# Patient Record
Sex: FEMALE | Race: WHITE
[De-identification: names, ages, dates, MRNs, and addresses within clinical notes are randomized per-mention and may not be internally consistent; named-entity substitution may affect disease eponyms.]

---

## 2019-12-03 ENCOUNTER — HOSPITAL ENCOUNTER (EMERGENCY)
Dept: HOSPITAL 33 - ED | Age: 39
LOS: 1 days | Discharge: HOME | End: 2019-12-04
Payer: COMMERCIAL

## 2019-12-03 DIAGNOSIS — Z79.899: ICD-10-CM

## 2019-12-03 DIAGNOSIS — R10.9: ICD-10-CM

## 2019-12-03 DIAGNOSIS — R11.2: ICD-10-CM

## 2019-12-03 DIAGNOSIS — D64.9: Primary | ICD-10-CM

## 2019-12-03 DIAGNOSIS — N05.9: ICD-10-CM

## 2019-12-03 DIAGNOSIS — I10: ICD-10-CM

## 2019-12-03 LAB
ALBUMIN SERPL-MCNC: 4.2 G/DL (ref 3.5–5)
ALP SERPL-CCNC: 66 U/L (ref 38–126)
ALT SERPL-CCNC: 31 U/L (ref 0–35)
AMYLASE SERPL-CCNC: 69 U/L (ref 30–110)
ANION GAP SERPL CALC-SCNC: 14.7 MEQ/L (ref 5–15)
AST SERPL QL: 40 U/L (ref 14–36)
BASOPHILS # BLD AUTO: 0.07 10*3/UL (ref 0–0.4)
BASOPHILS NFR BLD AUTO: 0.7 % (ref 0–0.4)
BILIRUB BLD-MCNC: 0.3 MG/DL (ref 0.2–1.3)
BUN SERPL-MCNC: 9 MG/DL (ref 7–17)
CALCIUM SPEC-MCNC: 9.3 MG/DL (ref 8.4–10.2)
CHLORIDE SERPL-SCNC: 102 MMOL/L (ref 98–107)
CO2 SERPL-SCNC: 27 MMOL/L (ref 22–30)
CREAT SERPL-MCNC: 0.72 MG/DL (ref 0.52–1.04)
EOSINOPHIL # BLD AUTO: 0.12 10*3/UL (ref 0–0.5)
GLUCOSE SERPL-MCNC: 131 MG/DL (ref 74–106)
GLUCOSE UR-MCNC: NEGATIVE MG/DL
HCT VFR BLD AUTO: 29.4 % (ref 35–47)
HGB BLD-MCNC: 8.3 GM/DL (ref 12–16)
LIPASE SERPL-CCNC: 41 U/L (ref 23–300)
LYMPHOCYTES # SPEC AUTO: 2.18 10*3/UL (ref 1–4.6)
MCH RBC QN AUTO: 19.5 PG (ref 26–32)
MCHC RBC AUTO-ENTMCNC: 28.2 G/DL (ref 32–36)
MONOCYTES # BLD AUTO: 0.75 10*3/UL (ref 0–1.3)
PLATELET # BLD AUTO: 275 K/MM3 (ref 150–450)
POTASSIUM SERPLBLD-SCNC: 4.3 MMOL/L (ref 3.5–5.1)
PROT SERPL-MCNC: 7.9 G/DL (ref 6.3–8.2)
PROT UR STRIP-MCNC: 100 MG/DL
RBC # BLD AUTO: 4.25 M/MM3 (ref 4.1–5.4)
SODIUM SERPL-SCNC: 139 MMOL/L (ref 137–145)
WBC # BLD AUTO: 10.4 K/MM3 (ref 4–10.5)
WBC #/AREA URNS HPF: (no result) /HPF (ref 0–5)

## 2019-12-03 PROCEDURE — 36415 COLL VENOUS BLD VENIPUNCTURE: CPT

## 2019-12-03 PROCEDURE — 36000 PLACE NEEDLE IN VEIN: CPT

## 2019-12-03 PROCEDURE — 96360 HYDRATION IV INFUSION INIT: CPT

## 2019-12-03 PROCEDURE — 96376 TX/PRO/DX INJ SAME DRUG ADON: CPT

## 2019-12-03 PROCEDURE — 96375 TX/PRO/DX INJ NEW DRUG ADDON: CPT

## 2019-12-03 PROCEDURE — 74176 CT ABD & PELVIS W/O CONTRAST: CPT

## 2019-12-03 PROCEDURE — 80053 COMPREHEN METABOLIC PANEL: CPT

## 2019-12-03 PROCEDURE — 81001 URINALYSIS AUTO W/SCOPE: CPT

## 2019-12-03 PROCEDURE — 96374 THER/PROPH/DIAG INJ IV PUSH: CPT

## 2019-12-03 PROCEDURE — 84703 CHORIONIC GONADOTROPIN ASSAY: CPT

## 2019-12-03 PROCEDURE — 83690 ASSAY OF LIPASE: CPT

## 2019-12-03 PROCEDURE — 99284 EMERGENCY DEPT VISIT MOD MDM: CPT

## 2019-12-03 PROCEDURE — 85025 COMPLETE CBC W/AUTO DIFF WBC: CPT

## 2019-12-03 PROCEDURE — 82150 ASSAY OF AMYLASE: CPT

## 2019-12-03 NOTE — ERPHSYRPT
- History of Present Illness


Time Seen by Provider: 12/03/19 22:20


Historian: patient


Exam Limitations: no limitations


Patient Subjective Stated Complaint: pt states she has had pain in her rt side 

since dunday night. states the pain woke her up. tonight pain has increased. 

states pain is worse when laying down on rt side.


Triage Nursing Assessment: pt alert and oriented, answers questions approp. pt 

ambulatory with steady gait noted. respirations nonlabored with lungs cta. abd 

soft and nontender. pt reports tenderness to rt side


Physician History: 





38 y/o white female presents with 2 days h/o right upper side and flank pain. 

sx worsening. pt had one episode of vomiting sunday night and has been 

nauseated every since. no prior episodes. no fall or trauma. no prior abd 

surgeries. no diarrhea. no vaginal discharge or urinary sx


Timing/Duration: day(s) (2)


Activities at Onset: none


Quality: sharpness, stabbing


Abdominal Pain Onset Location: flank (right)


Pain Radiation: back


Severity of Pain-Max: moderate


Severity of Pain-Current: moderate


Modifying Factors: Improves With: vomiting


Associated Symptoms: nausea, vomiting, No chest pain


Previous symptoms: no prior history


Allergies/Adverse Reactions: 








metoprolol Allergy (Verified 12/03/19 22:20)


 Hives


Penicillins Allergy (Verified 12/03/19 22:20)


 Rash





Home Medications: 








Lisinopril/Hydrochlorothiazide [Lisinopril-Hctz 10-12.5 mg Tab] 1 each PO DAILY 

12/03/19 [History]


Norethindrone-E.estradiol-Iron [Junel Fe 1.5 mg-30 Mcg Tablet] 1 each PO DAILY 

12/03/19 [History]





Hx Tetanus, Diphtheria Vaccination/Date Given: Yes


Hx Influenza Vaccination/Date Given: No


Hx Pneumococcal Vaccination/Date Given: No


Immunizations Up to Date: Yes





- Review of Systems


Constitutional: No Symptoms


Eyes: No Symptoms


Ears, Nose, & Throat: No Symptoms


Respiratory: No Symptoms


Cardiac: No Symptoms


Abdominal/Gastrointestinal: Nausea, Vomiting, No Diarrhea


Genitourinary Symptoms: No Symptoms, No Dysuria, No Frequency, No Vaginal 

Discharge


Musculoskeletal: Back Pain, No Injury


Skin: No Symptoms


Neurological: No Symptoms


Psychological: No Symptoms


Endocrine: No Symptoms


Hematologic/Lymphatic: No Symptoms


Immunological/Allergic: No Symptoms


All Other Systems: Reviewed and Negative





- Past Medical History


Pertinent Past Medical History: Yes


Neurological History: Migraines


Cardiac History: Hypertension


Respiratory History: No Pertinent History


Endocrine Medical History: No Pertinent History


Musculoskeletal History: No Pertinent History


GI Medical History: No Pertinent History


 History: No Pertinent History


Psycho-Social History: No Pertinent History


Female Reproductive Disorders: No Pertinent History





- Past Surgical History


Past Surgical History: Yes


Neuro Surgical History: No Pertinent History


Cardiac: No Pertinent History


Respiratory: No Pertinent History


Gastrointestinal: No Pertinent History


Genitourinary: No Pertinent History


Musculoskeletal: No Pertinent History


Female Surgical History: No Pertinent History


Other Surgical History: lap procedure in aug





- Social History


Smoking Status: Never smoker


Exposure to second hand smoke: No


Drug Use: none


Patient Lives Alone: No





- Female History


Hx Last Menstrual Period: 6mos- bc to prevent


Hx Pregnant Now: No





- Nursing Vital Signs


Nursing Vital Signs: 


 Initial Vital Signs











Temperature  97.9 F   12/03/19 22:07


 


Pulse Rate  97 H  12/03/19 22:07


 


Respiratory Rate  18   12/03/19 22:07


 


Blood Pressure  174/97   12/03/19 22:07


 


O2 Sat by Pulse Oximetry  100   12/03/19 22:07








 Pain Scale











Pain Intensity                 6

















- Physical Exam


General Appearance: mild distress, alert, anxiety


Eye Exam: PERRL/EOMI, eyes nml inspection


Ears, Nose, Throat Exam: normal ENT inspection, moist mucous membranes


Neck Exam: normal inspection, non-tender, supple, full range of motion


Respiratory Exam: normal breath sounds, lungs clear, airway intact, No chest 

tenderness, No respiratory distress


Cardiovascular Exam: regular rate/rhythm, normal heart sounds, normal 

peripheral pulses


Gastrointestinal/Abdomen Exam: soft, normal bowel sounds, No tenderness, No 

guarding


Pelvic Exam: not done


Rectal Exam: not done


Back Exam: normal inspection, normal range of motion, CVA tenderness (right), 

No vertebral tenderness


Extremity Exam: normal inspection, normal range of motion, pelvis stable


Neurologic Exam: alert, oriented x 3, cooperative, CNs II-XII nml as tested


Skin Exam: normal color, warm, dry


Lymphatic Exam: No adenopathy


**SpO2 Interpretation**: normal


SpO2: 100


O2 Delivery: Room Air





- Course


Nursing assessment & vital signs reviewed: Yes


Ordered Tests: 


 Active Orders 24 hr











 Category Date Time Status


 


 IV Insertion STAT Care  12/03/19 22:34 Active


 


 ABDOMEN AND PELVIS W/0 CONTRAS [CT] Stat Exams  12/03/19 22:35 Taken


 


 AMYLASE Stat Lab  12/03/19 22:34 Completed


 


 CBC W DIFF Stat Lab  12/03/19 22:34 Completed


 


 CMP Stat Lab  12/03/19 22:34 Completed


 


 HCG,QUALITATIVE URINE Stat Lab  12/03/19 22:41 Completed


 


 LIPASE Stat Lab  12/03/19 22:34 Completed


 


 UA W/RFX UR CULTURE Stat Lab  12/03/19 22:41 Completed








Medication Summary











Generic Name Dose Route Start Last Admin





  Trade Name Darrian  PRN Reason Stop Dose Admin


 


Hydromorphone HCl  0.5 mg  12/04/19 00:29  





  Hydromorphone 1 Mg/Ml Ampule***  IV  12/04/19 00:30  





  STAT ONE   





     





     





     





     














Discontinued Medications














Generic Name Dose Route Start Last Admin





  Trade Name Darrian  PRN Reason Stop Dose Admin


 


Hydromorphone HCl  0.5 mg  12/03/19 22:34  12/03/19 23:05





  Hydromorphone 1 Mg/Ml Ampule***  IV  12/03/19 22:35  0.5 mg





  STAT ONE   Administration





     





     





     





     


 


Hydromorphone HCl  Confirm  12/03/19 23:00  





  Hydromorphone 1 Mg/Ml Ampule***  Administered  12/03/19 23:01  





  Dose   





  1 mg   





  .ROUTE   





  .STK-MED ONE   





     





     





     





     


 


Sodium Chloride  1,000 mls @ 999 mls/hr  12/03/19 22:34  12/03/19 23:05





  Sodium Chloride 0.9% 1000 Ml  IV  12/03/19 23:34  999 mls/hr





  .Q1H1M STA   Administration





     





     





     





     


 


Sodium Chloride  Confirm  12/03/19 23:00  





  Sodium Chloride 0.9% 1000 Ml  Administered  12/03/19 23:01  





  Dose   





  1,000 mls @ ud   





  .ROUTE   





  .STK-MED ONE   





     





     





     





     


 


Ketorolac Tromethamine  30 mg  12/04/19 00:21  





  Toradol 30 Mg Injection***  IV  12/04/19 00:22  





  STAT ONE   





     





     





     





     


 


Ondansetron HCl  4 mg  12/03/19 22:34  12/03/19 23:05





  Zofran 4 Mg/2 Ml Vial**  IV  12/03/19 22:35  4 mg





  STAT ONE   Administration





     





     





     





     


 


Ondansetron HCl  Confirm  12/03/19 23:00  





  Zofran 4 Mg/2 Ml Vial**  Administered  12/03/19 23:01  





  Dose   





  4 mg   





  .ROUTE   





  .STK-MED ONE   





     





     





     





     


 


Ondansetron HCl  4 mg  12/04/19 00:21  





  Zofran 4 Mg/2 Ml Vial**  IV  12/04/19 00:22  





  STAT ONE   





     





     





     





     


 


Trimethoprim/Sulfamethoxazole  1 tab  12/04/19 00:22  





  Bactrim Ds Tablet***  PO  12/04/19 00:23  





  STAT STA   





     





     





     





     











Lab/Rad Data: 


 Laboratory Result Diagrams





 12/03/19 22:34 





 12/03/19 22:34 





 Laboratory Results











  12/03/19 12/03/19 12/03/19 Range/Units





  22:41 22:41 22:34 


 


WBC     (4.0-10.5)  K/mm3


 


RBC     (4.1-5.4)  M/mm3


 


Hgb     (12.0-16.0)  gm/dl


 


Hct     (35-47)  %


 


MCV     ()  fl


 


MCH     (26-32)  pg


 


MCHC     (32-36)  g/dl


 


RDW     (11.5-14.0)  %


 


Plt Count     (150-450)  K/mm3


 


MPV     (6-9.5)  fl


 


Gran %     (36.0-66.0)  %


 


Eos # (Auto)     (0-0.5)  


 


Absolute Lymphs (auto)     (1.0-4.6)  


 


Absolute Monos (auto)     (0.0-1.3)  


 


Lymphocytes %     (24.0-44.0)  %


 


Monocytes %     (0.0-12.0)  %


 


Eosinophils %     (0.00-5.0)  %


 


Basophils %     (0.0-0.4)  %


 


Absolute Granulocytes     (1.4-6.9)  


 


Basophils #     (0-0.4)  


 


Sodium    139  (137-145)  mmol/L


 


Potassium    4.3  (3.5-5.1)  mmol/L


 


Chloride    102  ()  mmol/L


 


Carbon Dioxide    27  (22-30)  mmol/L


 


Anion Gap    14.7  (5-15)  MEQ/L


 


BUN    9  (7-17)  mg/dL


 


Creatinine    0.72  (0.52-1.04)  mg/dL


 


Estimated GFR    > 60.0  ML/MIN


 


Glucose    131 H  ()  mg/dL


 


Calcium    9.3  (8.4-10.2)  mg/dL


 


Total Bilirubin    0.30  (0.2-1.3)  mg/dL


 


AST    40 H  (14-36)  U/L


 


ALT    31  (0-35)  U/L


 


Alkaline Phosphatase    66  ()  U/L


 


Serum Total Protein    7.9  (6.3-8.2)  g/dL


 


Albumin    4.2  (3.5-5.0)  g/dL


 


Amylase    69  ()  U/L


 


Lipase    41  ()  U/L


 


Urine Color  YELLOW    (YELLOW)  


 


Urine Appearance  SLIGHTLY CLOUDY    (CLEAR)  


 


Urine pH  6.0    (5-6)  


 


Ur Specific Gravity  1.011    (1.005-1.025)  


 


Urine Protein  100    (Negative)  


 


Urine Ketones  NEGATIVE    (NEGATIVE)  


 


Urine Blood  SMALL    (0-5)  Faraz/ul


 


Urine Nitrite  NEGATIVE    (NEGATIVE)  


 


Urine Bilirubin  NEGATIVE    (NEGATIVE)  


 


Urine Urobilinogen  NEGATIVE    (0-1)  mg/dL


 


Ur Leukocyte Esterase  NEGATIVE    (NEGATIVE)  


 


Urine WBC (Auto)  3-5    (0-5)  /HPF


 


Urine RBC (Auto)  11-15    (0-2)  /HPF


 


U Epithel Cells (Auto)  NONE    (FEW)  /HPF


 


Urine Bacteria (Auto)  FEW    (NEGATIVE)  /HPF


 


Urine Mucus (Auto)  SLIGHT    (NEGATIVE)  /HPF


 


Urine Culture Reflexed  NO    (NO)  


 


Urine Glucose  NEGATIVE    (NEGATIVE)  mg/dL


 


Urine HCG, Qual   NEGATIVE   (Negative)  














  12/03/19 Range/Units





  22:34 


 


WBC  10.4  (4.0-10.5)  K/mm3


 


RBC  4.25  (4.1-5.4)  M/mm3


 


Hgb  8.3 L  (12.0-16.0)  gm/dl


 


Hct  29.4 L  (35-47)  %


 


MCV  69.2 L  ()  fl


 


MCH  19.5 L  (26-32)  pg


 


MCHC  28.2 L  (32-36)  g/dl


 


RDW  19.9 H  (11.5-14.0)  %


 


Plt Count  275  (150-450)  K/mm3


 


MPV  9.9 H  (6-9.5)  fl


 


Gran %  70.0 H  (36.0-66.0)  %


 


Eos # (Auto)  0.12  (0-0.5)  


 


Absolute Lymphs (auto)  2.18  (1.0-4.6)  


 


Absolute Monos (auto)  0.75  (0.0-1.3)  


 


Lymphocytes %  20.9 L  (24.0-44.0)  %


 


Monocytes %  7.2  (0.0-12.0)  %


 


Eosinophils %  1.2  (0.00-5.0)  %


 


Basophils %  0.7  (0.0-0.4)  %


 


Absolute Granulocytes  7.29 H  (1.4-6.9)  


 


Basophils #  0.07  (0-0.4)  


 


Sodium   (137-145)  mmol/L


 


Potassium   (3.5-5.1)  mmol/L


 


Chloride   ()  mmol/L


 


Carbon Dioxide   (22-30)  mmol/L


 


Anion Gap   (5-15)  MEQ/L


 


BUN   (7-17)  mg/dL


 


Creatinine   (0.52-1.04)  mg/dL


 


Estimated GFR   ML/MIN


 


Glucose   ()  mg/dL


 


Calcium   (8.4-10.2)  mg/dL


 


Total Bilirubin   (0.2-1.3)  mg/dL


 


AST   (14-36)  U/L


 


ALT   (0-35)  U/L


 


Alkaline Phosphatase   ()  U/L


 


Serum Total Protein   (6.3-8.2)  g/dL


 


Albumin   (3.5-5.0)  g/dL


 


Amylase   ()  U/L


 


Lipase   ()  U/L


 


Urine Color   (YELLOW)  


 


Urine Appearance   (CLEAR)  


 


Urine pH   (5-6)  


 


Ur Specific Gravity   (1.005-1.025)  


 


Urine Protein   (Negative)  


 


Urine Ketones   (NEGATIVE)  


 


Urine Blood   (0-5)  Faraz/ul


 


Urine Nitrite   (NEGATIVE)  


 


Urine Bilirubin   (NEGATIVE)  


 


Urine Urobilinogen   (0-1)  mg/dL


 


Ur Leukocyte Esterase   (NEGATIVE)  


 


Urine WBC (Auto)   (0-5)  /HPF


 


Urine RBC (Auto)   (0-2)  /HPF


 


U Epithel Cells (Auto)   (FEW)  /HPF


 


Urine Bacteria (Auto)   (NEGATIVE)  /HPF


 


Urine Mucus (Auto)   (NEGATIVE)  /HPF


 


Urine Culture Reflexed   (NO)  


 


Urine Glucose   (NEGATIVE)  mg/dL


 


Urine HCG, Qual   (Negative)  














- Progress


Progress: improved


Progress Note: 





12/04/19 00:30


ct abd/pelvis-mild right perinephric stranding mostly along lower pole 

extending inferiorly along retroperitoneum.  this was d/w pt





d/w about giving antibx since she has a left shift, right flank pain, ct 

evidence of perinephric stranding, few bacteruria, and few wbcs in urine.





pt has h/o uterine fibroids and chronic anemia. 


Counseled pt/family regarding: lab results, diagnosis, need for follow-up, rad 

results





- Departure


Departure Disposition: Home


Clinical Impression: 


 Chronic anemia, Type of kidney inflammation





Condition: Stable


Critical Care Time: No


Additional Instructions: 


drink plenty of fluids. add ibuprofen for pain. follow up with primary doctor 

today by phone to arrange follow up appointment


Prescriptions: 


Hydrocodone/APAP 5/325*** [Norco 5/325 mg***] 1 each PO Q6H PRN PRN #8 tablet 

MDD 4


 PRN Reason: Pain


Smz/Tmp Ds Tablet*** [Bactrim Ds Tablet***] 1 udtab PO BID #14 tablet

## 2019-12-04 VITALS — SYSTOLIC BLOOD PRESSURE: 149 MMHG | OXYGEN SATURATION: 98 % | DIASTOLIC BLOOD PRESSURE: 91 MMHG | HEART RATE: 89 BPM

## 2019-12-04 NOTE — XRAY
Indication: Right flank pain with nausea.  Patient reports unsuccessful

uterine fibroid ablation August 2019.



Multiple contiguous axial images obtained through the abdomen and pelvis

without contrast as ordered.



Comparison: None.



Lung bases demonstrates minimal bibasilar dependent atelectasis.  Heart is not

enlarged.



Noncontrasted stomach and bowel loops appear nonobstructed.  Appendix not

seen.  Minimal sigmoid diverticulosis.  There are multiple gallstones, largest

1.6 cm.  Spleen is enlarged measuring 13.6 cm in greatest axial dimension



Right kidney appears mildly edematous with perinephric stranding but no renal

calculus or evidence for obstructive uropathy.  Underlying

inflammatory/infectious process is of primary concern.  No free fluid/air.

Uterus is markedly enlarged and lobular measuring 10.1 x 13.7 x 16.6 cm

favoring known uterine fibroids.



Remaining liver, pancreas, adrenal glands, left kidney, left ureter, bladder,

and aorta appear unremarkable for noncontrast exam.



Osseous structures intact.



Impression:

1.  Edematous right kidney with perinephric stranding but no renal calculus or

evidence for obstructive uropathy.  Rule out pyelonephritis.

2.  Markedly enlarged/lobular uterus favoring known fibroids.

3.  Incidental splenomegaly and sigmoid diverticulosis.



Comment: Preliminary interpretation was made by [facility].  No critical discrepancy.



CTDI 17.01